# Patient Record
Sex: FEMALE | NOT HISPANIC OR LATINO | Employment: FULL TIME | URBAN - METROPOLITAN AREA
[De-identification: names, ages, dates, MRNs, and addresses within clinical notes are randomized per-mention and may not be internally consistent; named-entity substitution may affect disease eponyms.]

---

## 2023-05-05 LAB
C REACTIVE PROTEIN (MG/L) IN SER/PLAS: 0.67 MG/DL
COMPLEMENT C3 (MG/DL) IN SER/PLAS: 168 MG/DL (ref 87–200)
COMPLEMENT C4 (MG/DL) IN SER/PLAS: 55 MG/DL (ref 10–50)
SEDIMENTATION RATE, ERYTHROCYTE: 21 MM/H (ref 0–20)

## 2023-05-08 LAB — GLUCOSE-6-PHOSPHATE DEHYDROGENASE, QUANT: 14 U/G HB (ref 9.9–16.6)

## 2023-05-11 LAB
ANTI-CENTROMERE: <0.2 AI
ANTI-CHROMATIN: <0.2 AI
ANTI-DNA (DS): <1 IU/ML
ANTI-JO-1 IGG: <0.2 AI
ANTI-NUCLEAR ANTIBODY (ANA): NEGATIVE
ANTI-RIBOSOMAL P: <0.2 AI
ANTI-RNP: <0.2 AI
ANTI-SCL-70: <0.2 AI
ANTI-SM/RNP: <0.2 AI
ANTI-SM: <0.2 AI
ANTI-SSA: <0.2 AI
ANTI-SSB: <0.2 AI

## 2023-12-14 ENCOUNTER — OFFICE VISIT (OUTPATIENT)
Dept: RHEUMATOLOGY | Facility: CLINIC | Age: 38
End: 2023-12-14
Payer: COMMERCIAL

## 2023-12-14 VITALS
WEIGHT: 183 LBS | HEIGHT: 66 IN | BODY MASS INDEX: 29.41 KG/M2 | HEART RATE: 99 BPM | DIASTOLIC BLOOD PRESSURE: 89 MMHG | SYSTOLIC BLOOD PRESSURE: 129 MMHG

## 2023-12-14 DIAGNOSIS — M79.7 FIBROMYALGIA: Primary | ICD-10-CM

## 2023-12-14 PROCEDURE — 99213 OFFICE O/P EST LOW 20 MIN: CPT | Performed by: INTERNAL MEDICINE

## 2023-12-14 PROCEDURE — 1036F TOBACCO NON-USER: CPT | Performed by: INTERNAL MEDICINE

## 2023-12-14 RX ORDER — METHOCARBAMOL 500 MG/1
500 TABLET, FILM COATED ORAL NIGHTLY
Qty: 30 TABLET | Refills: 4 | Status: SHIPPED | OUTPATIENT
Start: 2023-12-14 | End: 2024-12-13

## 2023-12-14 ASSESSMENT — PAIN SCALES - GENERAL: PAINLEVEL: 0-NO PAIN

## 2023-12-14 NOTE — PROGRESS NOTES
Informants: Patient and EMR.  PP: 38 year-old female with history of sickle cell trait, history of iron deficiency anemia.  CC:  [place]:She was initially evaluated in ER 12/2020 after shovelling snow. She had complaints of chest pain and shortness of breath. She was diagnosed with chest inflammation. She noted pain with deep breathing. In 3/2021 while on vacation in [place] with sun exposure, she noted scaly rash in the malar aspect of her face and blotchy rash on her arms. She also notes some pain with swelling in the lower extremities and feet lasting 2 weeks. She had hair loss around the anterior hairline of her scalp. She noted in high school having purple or whitish discoloration of her fingernail beds. She had laboratory tests that demonstrated a positive JAMIN and an elevated CRP as well as leukopenia. She notes having more frequent headaches.  The headaches tend to improve after sleeping overnight.  She has been under stress at work and at home since her daughter moved back into her home over the past 9 days.  She has noted tiny red dots on her arms.  She has not noted any recurrence of any joint swelling or lower extremity edema.  She has some slight discoloration of her fingers with cold exposure.  She has not been taking any muscle relaxants.  PH: Allergies: No known drug allergies; illnesses: Sickle cell trait, iron deficiency anemia secondary to blood loss, sinusitis; surgeries: Essure device (2/2013) that was subsequently removed due to dysfunctional uterine bleeding, uterine ablation, dental extraction.  SH: He denies any tobacco, alcohol, or illicit drug use. 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  FH: Father at age 52 had myocardial infarction. Mother had thyroid cancer and eczema. She has 6/2 brothers with unknown health history. She has no sisters. Her son is healthy. She has a daughter with sickle cell trait and another daughter with eczema. Paternal grandfather had prostate  cancer. Maternal grandfather had heart disease. Maternal grandmother had diabetes mellitus.  ROS: She has been evaluated with annual mammograms for increased focal density in the right breast with history of clear nipple discharge.  PX: She is a well-developed, well-nourished, black female. The lungs, heart, abdomen, and extremities are benign. The musculoskeletal examination does not show any joint effusions. There is good passive range of motion of the upper and lower extremity joints.The integument does not show any rashes on exposed skin.There is a tiny red dot on the proximal extensor aspect of the right thumb.  There are no telangiectasia about the face or palms of the hands.  Chest CT scan (11/22/2020) normal without evidence of pulmonary embolism.  Laboratory (5/5/2023) G6PD 14.0, JAMIN/DEANNE panel negative, ESR 21, CRP 0.67, C3 168, C4 59,(11/27/2020) JAMIN negative, ferritin 92, CRP 1.2, BUN 10, creatinine 0.80, glucose 84, ALT 46, AST 39, alkaline phosphatase 85, calcium 10.3, albumin 4.7, (4/24/2021) CRP 10.7 [<8], (6/14/2021) WBC 3.7, hemoglobin 13.4, hematocrit 42.4, MCV 86.0, MCHC 31.6, platelets 278, JAMIN positive.  Impression: 35 year-old black female with history of blue or white discoloration of her fingernail beds since high school age, history of rash in the malar aspect of the face and forearms after sun exposure while in [place], history of chest pain with deep breathing after shoveling snow in 12/2020, positive JAMIN, elevated CRP, leukopenia, tissue disorder, hepatitis autoimmune versus viral.  Plan: She is to do low impact exercise as tolerated.  Try to stay warm to avoid Raynaud's symptoms.  No new medications were prescribed.  She is to return at the next available office appointment.

## 2024-01-02 DIAGNOSIS — M25.50 ARTHRALGIA, UNSPECIFIED JOINT: ICD-10-CM

## 2024-01-02 DIAGNOSIS — M36.0: Primary | ICD-10-CM

## 2024-01-06 ENCOUNTER — LAB (OUTPATIENT)
Dept: LAB | Facility: LAB | Age: 39
End: 2024-01-06
Payer: COMMERCIAL

## 2024-01-06 DIAGNOSIS — M25.50 ARTHRALGIA, UNSPECIFIED JOINT: ICD-10-CM

## 2024-01-06 DIAGNOSIS — M36.0: ICD-10-CM

## 2024-01-06 LAB
ALBUMIN SERPL BCP-MCNC: 4.6 G/DL (ref 3.4–5)
ALP SERPL-CCNC: 84 U/L (ref 33–110)
ALT SERPL W P-5'-P-CCNC: 24 U/L (ref 7–45)
ANION GAP SERPL CALC-SCNC: 14 MMOL/L (ref 10–20)
AST SERPL W P-5'-P-CCNC: 22 U/L (ref 9–39)
BACTERIA #/AREA URNS AUTO: ABNORMAL /HPF
BASOPHILS # BLD AUTO: 0.02 X10*3/UL (ref 0–0.1)
BASOPHILS NFR BLD AUTO: 0.4 %
BILIRUB SERPL-MCNC: 0.3 MG/DL (ref 0–1.2)
BUN SERPL-MCNC: 15 MG/DL (ref 6–23)
C3 SERPL-MCNC: 167 MG/DL (ref 87–200)
C4 SERPL-MCNC: 55 MG/DL (ref 10–50)
CALCIUM SERPL-MCNC: 10.5 MG/DL (ref 8.6–10.6)
CHLORIDE SERPL-SCNC: 103 MMOL/L (ref 98–107)
CK SERPL-CCNC: 96 U/L (ref 0–215)
CO2 SERPL-SCNC: 30 MMOL/L (ref 21–32)
CREAT SERPL-MCNC: 0.9 MG/DL (ref 0.5–1.05)
CREAT UR-MCNC: 93.8 MG/DL (ref 20–320)
CRP SERPL-MCNC: 0.66 MG/DL
EOSINOPHIL # BLD AUTO: 0.06 X10*3/UL (ref 0–0.7)
EOSINOPHIL NFR BLD AUTO: 1.3 %
ERYTHROCYTE [DISTWIDTH] IN BLOOD BY AUTOMATED COUNT: 12.5 % (ref 11.5–14.5)
GFR SERPL CREATININE-BSD FRML MDRD: 84 ML/MIN/1.73M*2
GLUCOSE SERPL-MCNC: 65 MG/DL (ref 74–99)
HCT VFR BLD AUTO: 40.2 % (ref 36–46)
HGB BLD-MCNC: 12.6 G/DL (ref 12–16)
IMM GRANULOCYTES # BLD AUTO: 0.01 X10*3/UL (ref 0–0.7)
IMM GRANULOCYTES NFR BLD AUTO: 0.2 % (ref 0–0.9)
LYMPHOCYTES # BLD AUTO: 1.94 X10*3/UL (ref 1.2–4.8)
LYMPHOCYTES NFR BLD AUTO: 40.5 %
MCH RBC QN AUTO: 26.6 PG (ref 26–34)
MCHC RBC AUTO-ENTMCNC: 31.3 G/DL (ref 32–36)
MCV RBC AUTO: 85 FL (ref 80–100)
MONOCYTES # BLD AUTO: 0.42 X10*3/UL (ref 0.1–1)
MONOCYTES NFR BLD AUTO: 8.8 %
NEUTROPHILS # BLD AUTO: 2.34 X10*3/UL (ref 1.2–7.7)
NEUTROPHILS NFR BLD AUTO: 48.8 %
NRBC BLD-RTO: 0 /100 WBCS (ref 0–0)
PLATELET # BLD AUTO: 338 X10*3/UL (ref 150–450)
POTASSIUM SERPL-SCNC: 5.1 MMOL/L (ref 3.5–5.3)
PROT SERPL-MCNC: 7.5 G/DL (ref 6.4–8.2)
PROT UR-ACNC: 10 MG/DL (ref 5–24)
PROT/CREAT UR: 0.11 MG/MG CREAT (ref 0–0.17)
RBC # BLD AUTO: 4.73 X10*6/UL (ref 4–5.2)
RBC #/AREA URNS AUTO: ABNORMAL /HPF
SODIUM SERPL-SCNC: 142 MMOL/L (ref 136–145)
SQUAMOUS #/AREA URNS AUTO: ABNORMAL /HPF
WBC # BLD AUTO: 4.8 X10*3/UL (ref 4.4–11.3)
WBC #/AREA URNS AUTO: ABNORMAL /HPF

## 2024-01-06 PROCEDURE — 84156 ASSAY OF PROTEIN URINE: CPT

## 2024-01-06 PROCEDURE — 86140 C-REACTIVE PROTEIN: CPT

## 2024-01-06 PROCEDURE — 82570 ASSAY OF URINE CREATININE: CPT

## 2024-01-06 PROCEDURE — 81001 URINALYSIS AUTO W/SCOPE: CPT

## 2024-01-06 PROCEDURE — 86160 COMPLEMENT ANTIGEN: CPT

## 2024-01-06 PROCEDURE — 86225 DNA ANTIBODY NATIVE: CPT

## 2024-01-06 PROCEDURE — 82550 ASSAY OF CK (CPK): CPT

## 2024-01-06 PROCEDURE — 85025 COMPLETE CBC W/AUTO DIFF WBC: CPT

## 2024-01-06 PROCEDURE — 86235 NUCLEAR ANTIGEN ANTIBODY: CPT

## 2024-01-06 PROCEDURE — 80053 COMPREHEN METABOLIC PANEL: CPT

## 2024-01-06 PROCEDURE — 86038 ANTINUCLEAR ANTIBODIES: CPT

## 2024-01-06 PROCEDURE — 36415 COLL VENOUS BLD VENIPUNCTURE: CPT

## 2024-01-08 LAB — ANA SER QL HEP2 SUBST: NEGATIVE

## 2024-01-09 LAB
CENTROMERE B AB SER-ACNC: <0.2 AI
CHROMATIN AB SERPL-ACNC: <0.2 AI
DSDNA AB SER-ACNC: <1 IU/ML
ENA JO1 AB SER QL IA: <0.2 AI
ENA RNP AB SER IA-ACNC: <0.2 AI
ENA SCL70 AB SER QL IA: <0.2 AI
ENA SM AB SER IA-ACNC: <0.2 AI
ENA SM+RNP AB SER QL IA: <0.2 AI
ENA SS-A AB SER IA-ACNC: <0.2 AI
ENA SS-B AB SER IA-ACNC: <0.2 AI
RIBOSOMAL P AB SER-ACNC: <0.2 AI

## 2024-11-11 DIAGNOSIS — M79.7 FIBROMYALGIA: ICD-10-CM

## 2024-11-15 ENCOUNTER — OFFICE VISIT (OUTPATIENT)
Dept: PAIN MEDICINE | Facility: HOSPITAL | Age: 39
End: 2024-11-15
Payer: COMMERCIAL

## 2024-11-15 DIAGNOSIS — M79.7 FIBROMYALGIA: ICD-10-CM

## 2024-11-15 PROCEDURE — 99212 OFFICE O/P EST SF 10 MIN: CPT | Performed by: PAIN MEDICINE

## 2024-11-15 PROCEDURE — 99202 OFFICE O/P NEW SF 15 MIN: CPT | Performed by: PAIN MEDICINE

## 2024-11-15 ASSESSMENT — PAIN - FUNCTIONAL ASSESSMENT: PAIN_FUNCTIONAL_ASSESSMENT: 0-10

## 2024-11-15 ASSESSMENT — PAIN SCALES - GENERAL: PAINLEVEL_OUTOF10: 6

## 2024-11-15 NOTE — PROGRESS NOTES
Subjective   Patient ID: Loiad Blount is a 39 y.o. female       HPI:     A 39 year-old female with PMHx of sickle cell trait, fibromyalgia, history of iron deficiency anemia presents to the clinic with a request to get a letter to employer requesting accommodation to make her work remotely.    - Pt was seen and evaluated by Rheum who did autoimmune panel and their plan was:  - Low impact exercise as tolerated.    - Advised the pt to stay warm to avoid Raynaud's symptoms.      - Pt reports being under stress with her current daughter's illness and work related stressors.     - Pt takes Robaxin 500mg at bedtime      Physical Therapy: No  Other Conservative Measures she has tried: Heating Pad  Classes of medications tried in the past: Acetaminophen and NSAIDs      Review of Systems   13-point ROS done and negative except for HPI.     Current Outpatient Medications   Medication Instructions    methocarbamol (ROBAXIN) 500 mg, oral, Nightly       No past medical history on file.     Past Surgical History:   Procedure Laterality Date    OTHER SURGICAL HISTORY  08/12/2022    Breast biopsy        No family history on file.     No Known Allergies     Objective     There were no vitals filed for this visit.     Physical Exam  General: NAD, well groomed, well nourished  Eyes: Non-icteric sclera, EOMI  Ears, Nose, Mouth, and Throat: External ears and nose appear to be without deformity or rash. No lesions or masses noted. Hearing is grossly intact.   Neck: Trachea midline  Respiratory: Nonlabored breathing   Skin: No rashes or open lesions/ulcers identified on skin.    Palpation: Tenderness to palpation over Cervical spine, shoulders, clavicle, and elbows.    Psychiatric: Alert, orientation to person, place, and time. Cooperative.    Imaging personally reviewed and independently interpreted.    Assessment/Plan     A 39 year-old female with PMHx of sickle cell trait, fibromyalgia, history of iron deficiency anemia presents  to the clinic with a request to get a letter to employer requesting accommodation to make her work remotely.    - Pt was seen and evaluated by Rheum who did autoimmune panel and their plan was:  - Low impact exercise as tolerated.    - Advised the pt to stay warm to avoid Raynaud's symptoms.      - Pt reports being under stress with her current daughter's illness and work related stressors.     - Pt takes Robaxin 500mg at bedtime    - Given the amount of stressors and fatigue the pt experiencing, it would be advisable for her to work remotely.     Plan:  -Physical therapy  -Pt will contact her Rheumatologist\PCP to get the letter to employer requesting accomodation.       The patient was invited to contact us back anytime with any questions or concerns and follow-up with us in the office as needed.     Diagnoses and all orders for this visit:  Fibromyalgia  -     Referral to Pain Medicine      The patient was given an opportunity to ask questions and were answered. The patient verbalized understanding and was agreeable to plan.    The patient was discussed and seen with Dr. Morales.      Daniel Durand MD  Anesthesiology PGY-2  [facility]

## 2024-12-26 ENCOUNTER — EVALUATION (OUTPATIENT)
Dept: PHYSICAL THERAPY | Facility: CLINIC | Age: 39
End: 2024-12-26
Payer: COMMERCIAL

## 2024-12-26 ENCOUNTER — TELEPHONE (OUTPATIENT)
Dept: RHEUMATOLOGY | Facility: CLINIC | Age: 39
End: 2024-12-26

## 2024-12-26 DIAGNOSIS — M79.7 FIBROMYALGIA: Primary | ICD-10-CM

## 2024-12-26 PROCEDURE — 97750 PHYSICAL PERFORMANCE TEST: CPT | Mod: GP | Performed by: PHYSICAL THERAPIST

## 2024-12-26 ASSESSMENT — BALANCE ASSESSMENTS
PLACE ALTERNATE FOOT ON STEP OR STOOL WHILE STANDING UNSUPPORTED: ABLE TO STAND INDEPENDENTLY AND SAFELY AND COMPLETE 8 STEPS IN 20 SECONDS
TRANSFERS: ABLE TO TRANSFER SAFELY WITH MINOR USE OF HANDS
STANDING UNSUPPORTED WITH EYES CLOSED: ABLE TO STAND 10 SECONDS SAFELY
STANDING UNSUPPORTED WITH FEET TOGETHER: ABLE TO PLACE FEET TOGETHER INDEPENDENTLY AND STAND 1 MINUTE SAFELY
STANDING UNSUPPORTED ONE FOOT IN FRONT: LOSES BALANCE WHILE STEPPING OR STANDING
REACHING FORWARD WITH OUTSTRETCHED ARM WHILE STANDING: CAN REACH FORWARD 12 CM (5 INCHES)
STANDING ON ONE LEG: TRIES TO LIFT LEG UNABLE TO HOLD 3 SECONDS BUT REMAINS STANDING INDEPENDENTLY
STANDING UNSUPPORTED: ABLE TO STAND SAFELY FOR 2 MINUTES
LONG VERSION TOTAL SCORE (MAX 56): 48
TURN 360 DEGREES: ABLE TO TURN 360 DEGREES SAFELY IN 4 SECONDS OR LESS
STANDING TO SITTING: SITS SAFELY WITH MINIMAL USE OF HANDS
PLACE ALTERNATE FOOT ON STEP OR STOOL WHILE STANDING UNSUPPORTED: LOOKS BEHIND FROM BOTH SIDES AND WEIGHT SHIFTS WELL
PICK UP OBJECT FROM THE FLOOR FROM A STANDING POSITION: ABLE TO PICK UP SLIPPER SAFELY AND EASILY
STANDING TO SITTING: ABLE TO STAND WITHOUT USING HANDS AND STABILIZE INDEPENDENTLY
SITTING WITH BACK UNSUPPORTED BUT FEET SUPPORTED ON FLOOR OR ON A STOOL: ABLE TO SIT SAFELY AND SECURELY FOR 2 MINUTES

## 2024-12-26 NOTE — PROGRESS NOTES
FCE TEST SHEET:   Date: 12/26/24  Time in:  8:30am  Time out:  11:20am    Informed Consent  Patient has been informed of all evaluation findings and treatment plans and agrees to participate in Physical Therapy services and plans as outlined.      * (FCE-NEEDS FMLA FOR NEW COMPANY) FIBROMYALGIA M79.7; ANTHEM BCBS: 20% COINSUR // 1000 /2000 DED (948.30 /1831.93 REMAINING) // PRIOR AUTH IS NOT REQ // UNLIM V BMN // 0 COPAY // [email or web address] REF#: [id number] - [facility] Created     Time Calculation  Start Time: 0830  Stop Time: 1120  Time Calculation (min): 170 min  PT Evaluation Time Entry  Physical Performance Test (with Report) Time Entry: 170         Patient Name: Loida Blount  MRN:  [id number]  YOB: 1985  Age:  39 y.o.  Address:  [address] [place] [address]  City:  [place]  State:  [place]  Zip Code:  [address]  Employer:  [employer]  Job Title:  HR   Job Description:  HR  Founded in 1866, [employer] is a  global leader in the manufacture, development, distribution, and sale of paints, coatings and related products to professional, industrial, commercial, and retail customers. The company manufactures products under well-known brands such as [employer]®, Valspar®, HGTV HOME® by [employer], Central African Boy®, Krylon®, Minwax®, Bristow® Water Seal®, Cabot® and many more. [employer] branded products are sold exclusively through a chain of more than 4,100 company-operated stores and facilities, while the companys other brands are sold through [employer], home centers, independent paint dealers, hardware stores, automotive retailers, and industrial distributors. The company supplies a broad range of highly-engineered industrial and OEM coatings for wood and general industrial, coil, packaging, protective and marine, and transportation applications worldwide. Our 60,000 employees are diverse, innovative and passionate.  With a variety of rewarding and challenging opportunities, [facility] is a great place to find a career that takes you places.   POSITION OVERVIEW:   This position provides comprehensive HR support and advisory services to employees, managers and HR Business Partners via phone, email and cases with a goal of first contact resolution in accordance with Service Level Agreements.   Collects the necessary information regarding the request, analyzes the situation and determines the policies and laws related to the situation.   Uses these facts and previous experience related to similar situations to determine the appropriate course of action to handle the request.   Takes action or communicates information to the appropriate parties to resolve. Groups serviced include all [place] employees, managers, HR Business Partners, ex-employees, retirees, and other internal [facility] Departments.   Note: HR Services hours are Monday through Friday, 8:30 a.m. to 7:00 p.m.   Advisors are expected to work flexible shifts within those hours, to service our customers and business needs accordingly.   ESSENTIAL FUNCTIONS:   1. HR SERVICE CENTER RESPONSIBILITIES:   * Develop a comprehensive understanding of all HR Service Center operations, processes, systems and applicable policies and laws   * Answer questions, provide guidance and assist to employees, managers, and HR Business Partners in areas such as nursing home, Leave of Absence, [employer], GEMS administration, company policies, performance management systems, [facility]) and Careers.   * Ensure timely and correct response for all customers inquiries, complaints and requests and provide accurate consistent recordkeeping and maintenance of HR records. For cases escalated to other groups ensure that the request is resolved and the employee is contacted at the conclusion.   * Actively listen and ask appropriate follow up questions to collect  necessary information to identify underlying issues or related requests.   * Analyze information obtained and determine which policies and laws are relevant to the situation. Use the information to make decisions on action items needed to resolve the situation. Inform or escalate issues to HR BPs and COEs as needed.   * Utilize empathy, diplomacy & tact to promote positive employee engagement.   * Ensure fairness, consistency of practice, alignment with policies and procedures and adherence to all relevant laws in all situations handled.   * Identifies common questions and issues and create solutions to address them.   * Maintain data in the various HR systems (GEMS, Kronos, Careers etc.) Ensure data integrity and preform accurate and timely entry. * Perform designated audit reports and make the necessary corrections in the system, or work with the appropriate groups to accomplish.   * Ensure the various employee compliance notifications are sent as directed and if they do not reach the designated  reach out the to the HR BP or District coordinator to facilitate.   * Act as a liaison for employees, retirees, ex-employees, managers and HR to obtain answers to questions or resolve issues by partnering with other intercompany departments such as Employee Relations, Tax, Payroll, Legal, and Benefits   * Collect all necessary paperwork to document and administrate retirements and notify HR and Management of any pertinent details including how to update time keeping and GEMS accurately.   2. STANDARDIZATION AND PROCESS IMPROVEMENT   * Analyze tasks assigned and redesign and document each in a Standard Operating Procedure (SOP). Update SOPs as process changes. * Identify other potential activities that can be added to Shared Services.   * Participate in Transactional Lean Training & become LEAN Certified. Use the methods & tools learned through LEAN training to identify and implement continuous improvement  initiatives within the department and/or for the [facility] HR community throughout the year.   3. HR TOOLS & SERVICE LEVEL AGREEMENTS:   * Answer phone calls, respond to emails and IMs, create cases and preform responsibilities within the established service level agreements for each duty assigned.   * Utilize case management to document each request and to escalate work to appropriate teams. Leverage the case management self-service portal with HRBPs, Managers and employees to assist with self-service.   * Resolve assigned cases within designated Service Level Agreements   * Leverage [employer] Systems, [employer], and [employer] to obtain information and  HRBPs, employees, and managers on how to find and use these resources.   POSITION REQUIREMENTS: Basic Qualifications:   * Bachelors Degree from an accredited institution is required   * 1-3 years of experience in a fast paced customer service environment   * Must be authorized to work in the [place] without company sponsorship Preferred Qualifications:   * Human Resources experience   * Bachelors Degree in Human Resources or business related field   * Bi-lingual ability (Mexican) Personal Attributes:   * Exceptional interpersonal and customer service skills   * Excellent verbal and written communication skills   * Exceptional organizational skills with attention to detail, accuracy and ability to maintain confidential data   * Ability to analyze situations and interpret facts to make decisions and resolve problems   * Ability to manage multiple simultaneous tasks with minimal supervision   * Demonstrated ability to work in a team  Strong computer skills   * Adaptability to change and desire and ability to make improvements and to continuously improve processes and systems   Equal Opportunity Employer. All qualified candidates will receive consideration for employment and will not be discriminated against based on race, color, Methodist, sex, sexual orientation, gender  identify, national origin, protected  status, disability, age, pregnancy, genetic information, creed, citizenship status, marital status or any other consideration prohibited by law or contract. [facility] Federal Contractor requesting priority referral of protected veterans.   **Primary Location: **[facility] **Work Locations: ** **Organization: **[employer] **Schedule: **Full-time **Job Posting: **Jul 11, 2018 **Travel: **No  Occupation at time of injury:  Payment  at [employer]  Physician:  Dr. Habibeh Gitiforooz  Primary Diagnosis:  Diagnosis and Precautions: M79.7 (ICD-10-CM) - Fibromyalgia  Referred by: Referred By: Dr. Henao  Date of Injury (If Applicable):  N.A.  Gender: FEMALE  Dominant Hand: RIGHT HANDED  Current Occupation:   at [employer]    Reason for Testing:   Work from home vs. Returning to office full time    Description of test done:  RETURN TO WORK FCE ONE DAY    This cover letter provides a brief summary of relevant data regarding Loida Blount's physical abilities that were ascertained from the Functional Capacity Evaluation performed on 12/26/2024.      History:  Patient is a 39 year old RIGHT HANDED dominant FEMALE who comes in with an extensive medical history.  SHE stated that SHE is currently working and SHE is completing the majority of her everyday tasks.  SHE completed all tasks as able, within HER tolerance limits.  These tests were performed in a consistent manner.  These medical conditions and findings were gathered during the patient interview, demonstration and from patient report.  The results were also discussed with the client.      Overall Physical Demand Level:  Light/Medium    Effort and Cooperation:  Client's patterns of movement are consistent with the effort performed.  Client demonstrated cooperative behavior throughout testing.  Patient maintained a consistent level of effort  throughout the evaluation.  Patient followed instructions accurately and completely.  No instances of non-compliance or resistance.  Patient was open and honest about their limitations and symptoms.  Patient appeared motivated to perform to their best ability during testing.  No signs of disengagement or lack of interest during testing.    Consistency of Performance:  Client's performance was consistent throughout the entire FCE.  Patient actively participated during the evaluation.  Patient maintained a consistent pace and accuracy during various tasks.      Pain Report:  Patient does complain of increased neck/lower back pain throughout testing.    Safety:  Patient demonstrates safe overall movements during FCE testing.    Quality of Movement:  Client demonstrates good quality of movement overall during testing including movement efficiency (coordination and balance).    Abilities/Strengths:  SITTING TOLERANCE, STANDING BALANCE, WALKING BALANCE, CLIMBING STAIRS, KNEELING BILATERALLY, KNEELING ON ONE KNEE, STOOPING, FOOT CONTROL, REACHING, SIMPLE GRASP, POWER GRASP, PINCHING, and SENSORY DISCRIMINATION    Limitations:  STANDING TOLERANCE, WALKING TOLERANCE, UPPER EXTREMITY ROM, LOWER EXTREMITY ROM, SPINAL ROM, UPPER EXTREMITY STRENGTH, LOWER EXTREMITY STRENGTH, CORE STRENGTH, CROUCHING BALANCE, MATERIAL HANDLING, CLIMBING LADDERS, CROUCHING, SQUATTING, CRAWLING (HANDS/KNEES), CRAWLING (HANDS/FEET), and CARDIORESPIRATORY    Potential Barriers to Return to Work  Work Schedule and Environment  Current Arrangement: Patient works in-office five days per week but prefers a hybrid schedule (three days at home, two in-office) due to fibromyalgia, joint pain, and limited sitting tolerance.  Challenges:  Prolonged sitting requirements due to significant desk work involving emails, calls, and HR systems (e.g., [employer], GEMS).  Current office setup does not allow for posture variation or frequent movement breaks.  Limited  flexibility in remote work options within the current job description.  Physical Limitations  Sitting Tolerance: Low due to elevated lower back and neck pain.  Mobility:  Reduced standing and walking tolerance.  Stiffness and bilateral joint pain in shoulders, knees, hips, and IT bands, impacting movement and repetitive tasks.  Underlying Conditions:  Fibromyalgia, hypertension, and Raynaud’s exacerbate fatigue and pain.  High disability index scores (Neck Disability Index: 42%, Oswestry: 46%) indicate significant functional impairments.  Cognitive and Psychological Factors  Chronic Pain and Anxiety: May reduce focus and productivity.  High Cognitive Demands:  Requires strong organizational skills, attention to detail, and multitasking.  Sleep apnea, migraines, and fibro fog may further impair concentration and energy levels.  Physical/Ergonomic Risks  Repetitive hand and wrist movements for  may aggravate bilateral shoulder, arm, and IT band pain.  Long hours (Monday to Friday, 8:30 AM to 7:00 PM) exacerbate fatigue and pain due to limited rest and recovery time.    Recommendations for Addressing Concerns  Workplace Accommodations  Hybrid Work Schedule:  Implement a three-day remote, two-day in-office schedule to reduce commuting strain and support flexible posture changes.  Ergonomic Adjustments:  Provide an adjustable ergonomic chair with lumbar and neck support.  Use a height-adjustable desk to alternate between sitting and standing.  Ensure external peripherals (keyboard, mouse) and screen height minimize strain.  Breaks and Posture Changes:  Schedule short breaks every 30-60 minutes for movement and stretches (e.g., neck rolls, thoracic extensions, seated hip stretches).  Use alarms or reminders to encourage consistent breaks.  Task Modifications:  Break complex tasks into manageable segments.  Delegate physically demanding tasks where feasible.  Supportive Tools:  Use [id number] software and  wrist rests to reduce repetitive strain.  Provide heat/cold packs or lumbar cushions for pain relief.  Medical and Physical Therapy Support  Pain Management:  Utilize manual therapy, TENS, and heat/ice applications for symptom relief.  Targeted Exercises:  Gentle stretching for the cervical, thoracic, and lumbar spine.  Strengthening of postural muscles (core, glutes, upper back).  Low-impact aerobic activities (e.g., recumbent biking, swimming).  Fibromyalgia-Specific Strategies:  Gradual activity progression to avoid overexertion.  Incorporate relaxation techniques like diaphragmatic breathing and mindfulness.  Cognitive Support  Use digital task management tools for reminders and prioritization.  Provide written instructions for complex assignments to counteract fibro fog.  Limit multitasking and allow focused attention on one task at a time.  Employer Communication and Advocacy  HR Collaboration:  Advocate for reasonable accommodations under ADA guidelines.  Provide medical documentation to support the need for a hybrid schedule and ergonomic modifications.  Education and Awareness:  Educate team members and supervisors on fibromyalgia to foster a supportive work environment.  Regular Follow-Up:  Schedule periodic reassessments (every 3-6 months) to evaluate the effectiveness of accommodations and make necessary adjustments.    Short-Term Return to Work Plan  Hybrid Schedule: Begin with three remote days and two in-office days, reassess after 4-6 weeks.  Ergonomic Setup: Ensure proper workstation adjustments both at home and in-office.  Structured Breaks: Schedule micro-breaks and movement sessions throughout the workday.  Progress Tracking: Regularly monitor pain, function, and productivity to refine accommodations.    Long-Term Recommendations  Maintain a flexible hybrid work arrangement if effective.  Continue physical therapy and pain management interventions.  Evaluate functional capacity periodically to  align job responsibilities with the patient’s evolving capabilities.    Summary/Recommendations:  RECOMMEND PHYSICAL THERAPY, RECOMMEND PAIN MANAGEMENT, RECOMMEND SEEING A SPINE SPECIALIST/SURGEON, GET A SCRIPT FOR WORK CONDITIONING, and FOLLOW UP WITH YOUR PHYSICIAN WHO REFERRED YOU FOR THIS FCE  FUNCTIONAL TOLERANCES SUMMARY  SITTING TOLERANCE ABLE  OCCASIONAL   STANDING TOLERANCE ABLE  OCCASIONAL   WALKING TOLERANCEABLE  RARE   STANDING BALANCE ABLE  OCCASIONAL   WALKING BALANCE ABLE  OCCASIONAL   CROUCHING BALANCE patient deferred testing    CLIMBING STAIRS ABLE  OCCASIONAL   CLIMBING LADDERS  patient deferred testing   KNEELING BILATERALLY ABLE  RARE   KNEELING ON ONE KNEE ABLE  RARE   STOOPING ABLE  RARE   SQUATTING RESTRICTED  AVOID   CRAWLING (HANDS/KNEES)  patient deferred testing   CRAWLING (HANDS/FEET) patient deferred testing     FOOT CONTROL   Heel raises  right ABLE  OCCASIONAL   left ABLE  OCCASIONAL  Toe Raises  right ABLE  RARE   left ABLE  RARE  DOMINANT HAND (RIGHT OR LEFT):  right  REACHING OVERHEAD   R ARM ABLE  OCCASIONAL   LEFT ARM ABLE  OCCASIONAL   REACHING BELOW SHOULDER   right ABLE  OCCASIONAL   LEFT ARM ABLE  OCCASIONAL   SIMPLE GRASP   RIGHT ARM ABLE  FREQUENT   LEFT ABLE  FREQUENT   POWER GRASP   RIGHT ARM ABLE  FREQUENT    LEFT ABLE  FREQUENT   TIP PINCH   RIGHT ABLE  FREQUENT    LEFT ABLE  FREQUENT   KEY PINCH   RIGHT ABLE  FREQUENT    LEFT ABLE  FREQUENT   PALMAR PINCH   RIGHT ABLE  FREQUENT   LEFT ABLE  FREQUENT   KEYBOARDING   RIGHT ABLE  FREQUENT    LEFT ABLE  FREQUENT   SENSORY DISCRIMINATION   RIGHT ABLE  FREQUENT    LEFT ABLE  FREQUENT   SEE MATERIAL HANDLING CHART BELOW    CARDIORESPIRATORY (VO2 MAX AND MET LEVEL)      If permanent, the results of the FCE should be considered applicable for a range of time up to 6 months. This is dependent on the nature of the injury/illness, and whether any other health condition, injury or other factor changes the individual's health status  or lifestyle. In the absence of any substantive change in the individual's health status or lifestyle, a repeat FCE to update the individual's functional status is recommended.    It is my professional opinion that patient Loida Blount gave a consistent performance and effort during HER functional capacity evaluation. The results of this evaluation are a valid representation of HER current functional abilities.     OP PT Plan  PT Plan: No Additional PT interventions required at this time  PT Frequency: One time visit  Duration: 1 visit  Certification Period Start Date: 12/26/24  Certification Period End Date: 01/09/25  Number of Treatments Authorized: 1  Rehab Potential: Good  Plan of Care Agreement: Patient    Sincerely,  Lokesh SEAMAN Diana, PT, [id number]     FCE History  Any falls in the past 6 months?  NO    Mechanism/Type of injury:  Patient reports that everything started 5 years ago.  Patient reports having her first mammogram and was having R sided breast pain.  Patient reports having a breast biopsy due to her R breast leaking and in pain.  Patient reports seeing a Rheumatologist 4 years ago Dr. Cottrell and had 2 positive JAMIN tests.  Patient reports she started having aches and pains everywhere.  Patient reports being prescribed muscle relaxants and she would get very tired and she would only take them if she had a 3 day weekend.  Patient reports she has tried Yoga and struggled with this.  Patient reports using Asper creme for the pain.  Patient reports being diagnosed with Fibromyalgia 4 years ago.  Patient reports having no treatment for the Fibromyalgia thus far.    Previous Treatment:  Chiropractic ?  NO   Pain Management ?   NO   Acupuncture ?   NO   Surgery ?    NO   Previous Physical therapy?    NO   Previous Occupational therapy ?  NO   Previous speech therapy ?  NO     MEDICAL HISTORY   Note:  Copy Problem List from Snapshot  HYPERTENSION, HIGH CHOLESTEROL, ANGINA, ANEMIA, ANXIETY, SLEEP APNEA,  HEARING/VISION IMPAIRMENTS, HEADACHES, MIGRAINES, and FIBROMYALGIA, Raynaud's  Medical Precautions:  (See FCE)  STEADI Fall Risk Score (The score of 4 or more indicates an increased risk of falling): 0     Medical Precautions:  (See FCE))     History of Fractures/Dislocations:  none  Any previous orthopedic surgeries:  none  Use of Assistive Devices:  none    OTHER JOINT ISSUES, CERVICAL SPINE, THORACIC SPINE, LUMBAR SPINE, HIPS, KNEES, and SHOULDERS      General Health Checklist  GENERAL HEALTH CHECKLIST SYMPTOMS, ANY RECENT, UNEXPLAINED FATIGUE, CHILLS, and COGNITION CHANGES      SURGERIES: breast biopsy, partial hysterectomy    ALLERGIES: Seasonal    MEDICATIONS:    Current Outpatient Medications   Medication Instructions    methocarbamol (ROBAXIN) 500 mg, oral, Nightly   Steroid cream (as needed)    Radiology:    None    Functional Status/Activity level:  Level of Posey:  (See FCE)  basic ADL's/instrumental ADL's including  Sleeping:  Having issues with activity, see side note averages 4-5 hours of sleep per night (neck/back wakes her up)  Dressing:   Having issues with activity, see side note socks can be a struggle  Using the restroom/toilet:   No issues, independent with activity   Bathing or Showering:   No issues, independent with activity   Personal hygiene:   No issues, independent with activity   Eating and Drinking:   No issues, independent with activity   Preparing meals/Cooking:   No issues, independent with activity   Washing dishes:   No issues, independent with activity   Managing Medication:   No issues, independent with activity does not take her meds as much as she should  Reaching all planes:   Having issues with activity, see side note back/neck pain   Using phone/Technology/Computer/Ipad:   Having issues with activity, see side note back/neck pain  Reading:   No issues, independent with activity    Writing:   No issues, independent with activity   Typing:   No issues, independent with  activity   Managing finances:   No issues, independent with activity   Driving:  No issues, independent with activity   Shopping/ Getting Groceries:   No issues, independent with activity   Running errands/Appointments:   No issues, independent with activity   Cleaning / Maintaining home:   No issues, independent with activity   Vacuuming:   No issues, independent with activity   Dusting:   No issues, independent with activity   Laundry:  No issues, independent with activity   Environment    Heat:   Having issues with activity, see side note stays inside  Cold:   Having issues with activity, see side note stays inside (fingers hurt)  Dry:   No issues, independent with activity   Wet:   Having issues with activity, see side note increased joint pain  Walking:   Having issues with activity, see side note increased back pain/leg pain if walking > 1 mile  Standing:   Having issues with activity, see side note increased pain after 20 minutes  Sitting:   Having issues with activity, see side note increased pain after 20-30 minutes  Stairs:   No issues, independent with activity   Bending:   Having issues with activity, see side note increased back pain  Stooping:  Having issues with activity, see side note increased back pain  Squatting:   Having issues with activity, see side note increased leg pain  Lifting/pushing/pulling/carrying:   Having issues with activity, see side note increased back/leg pain  Hobbies:  No issues, independent with activity   Work:   Having issues with activity, see side note increased back pain/neck pain  Pet care:   No issues, independent with activity     Impact on School/work performance:  increased back/neck pain at work if sitting prolonged periods  Last time/day worked:  working currently    PRIOR INJURIES: Date or Year Type of Injury (i.e.-auto, work):  none     CHIEF COMPLAINTS (if applicable):   Pain Assessment:  (See FCE)  Neck pain/stiffness  Lower back pain/stiffness  Upper back  pain/stiffness  Leg pain  Bilateral shoulder pain  Bilateral knee pain/hip pain  Bilateral IT band pain  Pain Assessment  Pain Assessment:  (See FCE)    Pain report:  Body Region: 1. Cervical spine 2. Thoracic spine  3. Lumbar spine 4. R hip and L hip 5. R knee and L knee, 6.  Bilateral shoulders, 7.  Bilateral IT band pain  Pain level current:  1. 6/10 2. 4/10 3. 6/10 4. 6/10 5. 4/10, 6.  4/10, 7. 2/10  Pain level least in a 24 hour period: 1. 3/10 2. 3/10 3. 3/10 4. 3/10 5. 3/10, 6. 3/10, 7. 2/10  Pain level worst in a 24 hour period: 1. 6/10 2. 4/10 3. 6/10 4. 6/10 5. 6/10, 6.  4/10, 7. 3/10    Location of pain:  bilateral IT band pain, posterior neck pain, lower back pain, lateral hip pain, bilateral anterior knee pain  Quality of pain:  sharp pain, throbbing, shooting pain  Duration of pain:  constant  Aggravating factors:  prolonged sitting/standing/walking, stairs, bending, lifting, carrying/push/pull  Alleviating factors:  none    Return to work information: see job description  Goals:  Get this test done    Systems Review:  as filled out by patient (positives listed below by each system)  Cardiovascular- SHORTNESS OF BREATH, IS YOUR BLOOD PRESSURE UNDER CONTROL, and UNSURE     Constitutional- FATIGUE and CHILLS   Endocrine:  HEAT INTOLERANCE, COLD INTOLERANCE, DRY SKIN, IS YOUR BLOOD SUGAR UNDER CONTROL ?, and YES  GI:  NONE   Genitourinary:  NONE  Hematology:   NONE   HENT:  NONE  Integumentary:  NONE  Respiratory:  NONE  Musculoskeletal:  DIFFICULTY LYING FLAT DUE TO MUSCLE PAIN and BACK PAIN   Neurologic:  HEADACHES and NUMBNESS/TINGLING    Outcome Measures:  Other Measures  Neck Disability Index: 42%  Oswestry Disablity Index (ULISES): 46%    Pain Disability Index  Family Home Responsibilities  4  Recreation  4  Social Activity  4  Occupation  6  Sexual Behavior  3  Self Care  3  Life Supporting Activities  5  29/70    Objective Testing  Vital Signs  Heart Rate (HR):  74 bpm  Blood Pressure (BP):  122/82  mmHg  Oxygen Saturation (O2 Sat):  99%  Height:  5 feet, 5.5 inches  Weight:  199.8 lbs.    Orientation:  SHE was alert, oriented x 3    Splints/Braces/Assistive Devices worn by patient during FCE:  None  Gait:  Normal gait  Coordination:  bilateral upper extremity coordination WNL's, decreased lower extremity coordination bilaterally  Sensation:  patient denies numbness/tingling of bilateral upper/lower extremities  Palpation:  point tenderness over bilateral upper trapezius, levator scapula, cervical/lumbar paraspinals  Strength/ROM:  see Excel spread sheet  Upper Extremity:  see Excel spread sheet  Right:  see Excel spread sheet  Left:  see Excel spread sheet  Lower Extremity:  see Excel spread sheet  Right:  see Excel spread sheet  Left:  see Excel spread sheet  Lumbar AROM:  see Excel spread sheet  Cervical AROM:  see Excel spread sheet  Thoracic AROM:  see Excel spread sheet    FCE TEST SHEET:   Integumetary System: NOT IMPAIRED   Neuromuscular System: IMPAIRED Core stability/core strength  Musculoskeletal System: IMPAIRED Gross Range of Motion, Gross Strength    Communication/Cognition: NOT IMPAIRED      FCE TEST ACTIVITIES SIT, STAND, WALK TOLERANCES   SITTING (Norm: 30 min) Max Time: 30 Minutes  JOINT PAIN and GRIMACING  STANDING (Norm: 30 min) Max Time: 20 Minutes  JOINT PAIN, LEAN ON SUPPORT/DEVICE, and GRIMACING   WALKING (Norm: 30 min @ 2.0 mph) Max Time: 7  Minutes JOINT PAIN, LOWER EXTREMITY WEAKNESS, SHORTNESS OF BREATH, and HOLD RAILING    Any falls in the past 6 months?  NO   BALANCE  TESTING  Standing Balance (Norm: 30 sec) ABLE  OCCASIONAL, HR 96 bpm   Walking Balance (Norm: 6 feet) ABLE  OCCASIONAL,  bpm   Crouching Balance (Norm: 30 sec) patient deferred test     MORAN Test 48 / 56 LOW Fall Risk  Moran Balance Scale  1. Sitting to Standing: Able to stand without using hands and stabilize independently  2. Standing Unsupported: Able to stand safely for 2 minutes  3. Sitting with Back  Unsupported but Feet Supported on Floor or on a Stool: Able to sit safely and securely for 2 minutes  4. Standing to Sitting: Sits safely with minimal use of hands  5.  Transfers: Able to transfer safely with minor use of hands  6. Standing Unsupported with Eyes Closed: Able to stand 10 seconds safely  7. Standing Unsupported with Feet Together: Able to place feet together independently and stand 1 minute safely  8. Reach Forward with Outstretched Arm While Standing: Can reach forward 12 cm (5 inches)  9.  Object from Floor from a Standing Position: Able to  slipper safely and easily  10. Turning to Look Behind Over Left and Right Shoulders While Standing: Looks behind from both sides and weight shifts well  11. Turn 360 Degrees: Able to turn 360 degrees safely in 4 seconds or less  12. Place Alternate Foot on Step or Stool While Standing Unsupported: Able to stand independently and safely and complete 8 steps in 20 seconds  13. Standing Unsupported One Foot in Front: Loses balance while stepping or standing  14. Standing on One Leg: Tries to lift leg unable to hold 3 seconds but remains standing independently  Saunders Balance Score: 48  Other Measures  Neck Disability Index: 42%  Oswestry Disablity Index (ULISES): 46%     MATERIAL HANDLING (Occasional)  Empty box weighs 7.5 lbs.  RPE Scale 1-10 Description  No exertion at all  Very light  Light  Between Light and somewhat hard  Somewhat hard  Between somewhat hard and Hard (Heavy)  Hard (heavy)  Between Hard and very hard  Between very hard and maximal exertion  Maximal Exertion    Combined Lift/Carry Test    Endpoint: PSYCHOPHYSICAL   Weight lifted: 27.5 lbs.  RPE:  7  HR:  79 bpm  Pain:  6/10 lower back, 8/10 neck    PUSH (25 feet - Norm: 100 lbs)    Endpoint: PSYCHOPHYSICAL    Weight lifted:  50 lbs.  RPE:  7  HR:  91 bpm  Pain:  6/10 lower back    PULL (25 feet - Norm: 80 lbs)   Endpoint: PSYCHOPHYSICAL    Weight lifted:  50 lbs.  RPE:  7  HR:  91  bpm  Pain:  6/10 lower back    Floor to Waist Lift   Endpoint: PSYCHOPHYSICAL    Weight lifted:  32.5 lbs.  RPE:  5  HR:  84 bpm  Pain:  8/10 Neck    Waist to Shoulder   Endpoint: PSYCHOPHYSICAL    Weight lifted:  22.5 lbs.  RPE:  6  HR:  83 bpm  Pain:  7/10 neck, lower back    Shoulder to Overhead   Endpoint: PSYCHOPHYSICAL    Weight lifted:  7.5 lbs.  RPE:  6  HR:  75 bpm  Pain:  7/10 Neck    Bilateral Carry (14 Feet)   Endpoint: PSYCHOPHYSICAL    Weight lifted:  17.5 lbs.  RPE:  6  HR:  85 bpm  Pain:  7/10 Neck, lower back    FUNCTIONAL ACTIVITIES A   Climbing Stairs (Norm: up/down 1 flight) ABLE  OCCASIONAL,  bpm   Limits: FATIGUE and REQUIRES RAILING/DEVICE  Climbing Ladders (up/down 10 ft ladder)   patient deferred testing       Kneel: any knee (Norm: 30 seconds) ABLE  RARE, HR 74 bpm  Kneel: Both knees (Norm: 30 seconds) ABLE  RARE, HR 74 bpm    Stooping: Static forward bend test:  ABLE  RARE, HR 98 bpm   Repetitive Squat Test:  ARU: RESTRICTED   AVOID  # Reps: 7 Limits: WEAKNESS and JOINT PAIN,  bpm    FUNCTIONAL ACTIVITIES B   Crawl: hands/Knees (Norm: 6 feet)   patient deferred testing    Crawl: hand/feet (Norm: 6 feet)      patient deferred testing      Foot Control - Rt (5 toe/5 calf raises) heel raises ABLE  OCCASIONAL, HR 90 bpm, toe raises ABLE  RARE, HR 73 bpm    Overhead Reaching Test:  ABLE  OCCASIONAL, HR 91 bpm     HAND DEXTERITY - HANDLING   Simple Grasp - Seize, hold, grasp, or turn small objects with the RIGHT hand? ABLE  FREQUENT, HR 72 bpm   Simple Grasp - Seize, hold, grasp, or turn small objects with the LEFT hand? ABLE  FREQUENT      Power Grasp -    Strength Rt TRIAL 1:  40 lbs. TRIAL 2:  40 lbs. TRIAL 3: 50 lbs.   Power Grasp -  Strength Lt TRIAL 1: 38 lbs. TRIAL 2: 54 lbs. TRIAL 3: 56 lbs., HR 72 bpm         HAND DEXTERITY - FINGERING   Simple Picking:  a nut with all fingers   RT hand? ABLE  FREQUENT, HR 76 bpm    Simple Picking:  a nut with all  fingers   LT hand? ABLE  FREQUENT      In lbs.  TIP PINCH Trial 1 - RT:  9.4/10.6 LT: Trial 2 - RT: 6.0/6.8 LT:  Trial 3 - RT:  7.6/10.5 LT:   KEY PINCH Trial 1 - RT: 17.4/17.1 LT: Trial 2 - RT: 17.9/21.3 LT: Trial 3 - RT: 19.2/13.8 LT:   PALMAR PINCH Trial 1 - RT: 10.3/12.5 LT: Trial 2 - RT: 7.5/11.0 LT:  Trial 3 - RT: 8.5/11.7 LT:, HR 76 bpm      SENSORY   Discrimination between round and rectangular (washer vs. square nut):   RT: ABLE  FREQUENT    LT: ABLE  FREQUENT, HR 68 bpm    Discrimination between large and small objects (quarter vs. dime):   RT: ABLE  FREQUENT    LT: ABLE  FREQUENT      Discrimination between rough and smooth textures (sandpaper vs. cloth):   RT: ABLE  FREQUENT    LT: ABLE  FREQUENT     CARDIO-RESPIRATORY (if applicable): VARIABLES (If applicable)   Name or Type of Test: 1 mile treadmill walking test  Resting HR:  78 bpm  Speed of Treadmill: 2.0 mph  Max HR: 120 bpm  Distance: Other:   0.24 mile  Time:  7 minutes, 21 seconds  % grade:  0%  Calories:  21.1 calories  Blood pressure before testin/89 mmHg  Blood pressure after test:  134/90 mmHg  HR recovery:  81 bpm  Oxygen Saturation before testin%  Oxygen Saturation after testin%  39 year old female  Weight:  199.8 lbs.  Stopped testing due to complaints of lower back pain  Please estimate the VO2 max and MET level and show calculations.                CONSISTENCY OF EFFORT -   OVERALL OBSERVATION In your professional opinion, did the patient give a consistent performance and effort during this functional capacity evaluation? YES   BELL SHAPE CURVE   In lbs.  Right hand  Level 1:  32  Level 2:  46  Level 3:  52  Level 4:  39  Level 5:  34    Left hand  Level 1:  42  Level 2:  54  Level 3:  42  Level 4 :  41  Level 5:  30  HR  70 bpm    JAI NON ORGANIC SIGNS TEST   Superficial tenderness:  POSITIVE  Non-anatomic SIMULATION:  NEGATIVE  Axial Loading:  NEGATIVE  Acetabular rotation:  POSITIVE  Distraction:   NEGATIVE  Regional sensory disturbance:  NEGATIVE  Regional weakness:  NEGATIVE  Overreaction: NEGATIVE    BP at end of session:  134/90 mmHg  HR at end of session:  81 bpm  O2 Saturation at end of session:  98%

## 2024-12-26 NOTE — TELEPHONE ENCOUNTER
Patient needs her plan of care signed and faxed back to Dr. Ortiz. The plan of care is scanned in her chart.

## 2024-12-26 NOTE — LETTER
December 26, 2024    Lokesh Ortiz, PT  [address]  Rehab Services, [address]  [facility] [place] [address]    Patient: Loida Blount   YOB: 1985   Date of Visit: 12/26/2024       Dear Sonny Yuan MD  [address] [facility] [address]  [place] [address]    The attached plan of care is being sent to you because your patient’s medical reimbursement requires that you certify the plan of care. Your signature is required to allow uninterrupted insurance coverage.      You may indicate your approval by signing below and faxing this form back to us at Dept Fax: [phone].    Please call Dept: [phone] with any questions or concerns.    Thank you for this referral,        Lokesh Ortiz PT  [place] [address] [facility]  [address] [facility] [address]    Payer: Payor: ANTHEM / Plan: ANTHEM HMP / Product Type: *No Product type* /                                                                         Date:     Dear Lokesh Ortiz, PT,     Re: Ms. Loida Blount, MRN:[id number]    I certify that I have reviewed the attached plan of care and it is medically necessary for Ms. Loida Blount (1985) who is under my care.          ______________________________________                    _________________  Provider name and credentials                                           Date and time                                                                                           Plan of Care 12/26/24   Effective from: 12/26/2024  Effective to: 1/9/2025    Plan ID: [id number]            Participants as of Finalize on 12/26/2024    Name Type Comments Contact Info    Sonny Yuan MD Referring Provider  [phone]    Lokesh Ortiz PT Physical Therapist  [phone]       Last Plan Note     Author: Lokesh Ortiz PT Status: Incomplete Last edited: 12/26/2024  8:30 AM       FCE TEST SHEET:   Date: 12/26/24  Time in:  8:30am  Time out:   11:20am    Informed Consent  Patient has been informed of all evaluation findings and treatment plans and agrees to participate in Physical Therapy services and plans as outlined.      * (FCE-NEEDS FMLA FOR NEW COMPANY) FIBROMYALGIA M79.7; ANTHEM BCBS: 20% COINSUR // 1000 /2000 DED (948.30 /1831.93 REMAINING) // PRIOR AUTH IS NOT REQ // UNLIM V BMN // 0 COPAY // [email or web address] REF#: [id number] - [facility]     Time Calculation  Start Time: 0830  Stop Time: 1120  Time Calculation (min): 170 min  PT Evaluation Time Entry  Physical Performance Test (with Report) Time Entry: 170         Patient Name: Loida Blount  MRN:  [id number]  YOB: 1985  Age:  39 y.o.  Address:  [address] [place] [address]  City:  [place]  State:  [place]  Zip Code:  [address]  Employer:  [employer]  Job Title:  HR   Job Description:  HR  Founded in 1866, [employer] is a  global leader in the manufacture, development, distribution, and sale of paints, coatings and related products to professional, industrial, commercial, and retail customers. The company manufactures products under well-known brands such as Stackpop®, Valspar®, HGTV HOME® by Stack[employer], [place]orese Boy®, Krylon®, Minwax®, [place]® Water Seal®, Cabot® and many more. [employer] branded products are sold exclusively through a chain of more than 4,100 company-operated stores and facilities, while the companys other brands are sold through [employer], home centers, independent paint dealers, hardware stores, automotive retailers, and industrial distributors. The company supplies a broad range of highly-engineered industrial and OEM coatings for wood and general industrial, coil, packaging, protective and marine, and transportation applications worldwide. Our 60,000 employees are diverse, innovative and passionate. With a variety of rewarding and challenging opportunities,  [facility] [employer] is a great place to find a career that takes you places.   POSITION OVERVIEW:   This position provides comprehensive HR support and advisory services to employees, managers and HR Business Partners via phone, email and cases with a goal of first contact resolution in accordance with Service Level Agreements.   Collects the necessary information regarding the request, analyzes the situation and determines the policies and laws related to the situation.   Uses these facts and previous experience related to similar situations to determine the appropriate course of action to handle the request.   Takes action or communicates information to the appropriate parties to resolve. Groups serviced include all [place] employees, managers, HR Business Partners, ex-employees, retirees, and other internal [facility] Departments.   Note: HR Services hours are Monday through Friday, 8:30 a.m. to 7:00 p.m.   Advisors are expected to work flexible shifts within those hours, to service our customers and business needs accordingly.   ESSENTIAL FUNCTIONS:   1. HR SERVICE CENTER RESPONSIBILITIES:   * Develop a comprehensive understanding of all HR Service Center operations, processes, systems and applicable policies and laws   * Answer questions, provide guidance and assist to employees, managers, and HR Business Partners in areas such as group home, Leave of Absence, Kronos, GEMS administration, company policies, performance management systems, [facility]) and Careers.   * Ensure timely and correct response for all customers inquiries, complaints and requests and provide accurate consistent recordkeeping and maintenance of HR records. For cases escalated to other groups ensure that the request is resolved and the employee is contacted at the conclusion.   * Actively listen and ask appropriate follow up questions to collect necessary information to identify underlying issues or related  requests.   * Analyze information obtained and determine which policies and laws are relevant to the situation. Use the information to make decisions on action items needed to resolve the situation. Inform or escalate issues to HR BPs and COEs as needed.   * Utilize empathy, diplomacy & tact to promote positive employee engagement.   * Ensure fairness, consistency of practice, alignment with policies and procedures and adherence to all relevant laws in all situations handled.   * Identifies common questions and issues and create solutions to address them.   * Maintain data in the various HR systems (GEMS, Kronos, Careers etc.) Ensure data integrity and preform accurate and timely entry. * Perform designated audit reports and make the necessary corrections in the system, or work with the appropriate groups to accomplish.   * Ensure the various employee compliance notifications are sent as directed and if they do not reach the designated  reach out the to the HR BP or District coordinator to facilitate.   * Act as a liaison for employees, retirees, ex-employees, managers and HR to obtain answers to questions or resolve issues by partnering with other intercompany departments such as Employee Relations, Tax, Payroll, Legal, and Benefits   * Collect all necessary paperwork to document and administrate retirements and notify HR and Management of any pertinent details including how to update time keeping and GEMS accurately.   2. STANDARDIZATION AND PROCESS IMPROVEMENT   * Analyze tasks assigned and redesign and document each in a Standard Operating Procedure (SOP). Update SOPs as process changes. * Identify other potential activities that can be added to Shared Services.   * Participate in Transactional Lean Training & become LEAN Certified. Use the methods & tools learned through LEAN training to identify and implement continuous improvement initiatives within the department and/or for the S-W HR community  throughout the year.   3. HR TOOLS & SERVICE LEVEL AGREEMENTS:   * Answer phone calls, respond to emails and IMs, create cases and preform responsibilities within the established service level agreements for each duty assigned.   * Utilize case management to document each request and to escalate work to appropriate teams. Leverage the case management self-service portal with HRBPs, Managers and employees to assist with self-service.   * Resolve assigned cases within designated Service Level Agreements   * Leverage nexTune, [employer], and [employer] to obtain information and  HRBPs, employees, and managers on how to find and use these resources.   POSITION REQUIREMENTS: Basic Qualifications:   * Bachelors Degree from an accredited institution is required   * 1-3 years of experience in a fast paced customer service environment   * Must be authorized to work in the [place] without company sponsorship Preferred Qualifications:   * Human Resources experience   * Bachelors Degree in Human Resources or business related field   * Bi-lingual ability (Telugu) Personal Attributes:   * Exceptional interpersonal and customer service skills   * Excellent verbal and written communication skills   * Exceptional organizational skills with attention to detail, accuracy and ability to maintain confidential data   * Ability to analyze situations and interpret facts to make decisions and resolve problems   * Ability to manage multiple simultaneous tasks with minimal supervision   * Demonstrated ability to work in a team  Strong computer skills   * Adaptability to change and desire and ability to make improvements and to continuously improve processes and systems   Equal Opportunity Employer. All qualified candidates will receive consideration for employment and will not be discriminated against based on race, color, Lutheran, sex, sexual orientation, gender identify, national origin, protected  status, disability, age,  pregnancy, genetic information, creed, citizenship status, marital status or any other consideration prohibited by law or contract. [facility] Federal Contractor requesting priority referral of protected veterans.   **Primary Location: **[facility] **Work Locations: ** **Organization: **[employer] **Schedule: **Full-time **Job Posting: **Jul 11, 2018 **Travel: **No  Occupation at time of injury:  Payment  at [employer]  Physician:  Dr. Habibeh Gitiforooz  Primary Diagnosis:  Diagnosis and Precautions: M79.7 (ICD-10-CM) - Fibromyalgia  Referred by: Referred By: Dr. Henao  Date of Injury (If Applicable):  N.A.  Gender: FEMALE  Dominant Hand: RIGHT HANDED  Current Occupation:   at [employer]    Reason for Testing:   Work from home vs. Returning to office full time    Description of test done:  RETURN TO WORK FCE ONE DAY    This cover letter provides a brief summary of relevant data regarding Loida Castellanoss physical abilities that were ascertained from the Functional Capacity Evaluation performed on 12/26/2024.      History:  Patient is a 39 year old RIGHT HANDED dominant FEMALE who comes in with an extensive medical history.  SHE stated that SHE is currently working and SHE is completing the majority of her everyday tasks.  SHE completed all tasks as able, within HER tolerance limits.  These tests were performed in a consistent manner.  These medical conditions and findings were gathered during the patient interview, demonstration and from patient report.  The results were also discussed with the client.      Overall Physical Demand Level:  Light/Medium    Effort and Cooperation:  Client's patterns of movement are consistent with the effort performed.  Client demonstrated cooperative behavior throughout testing.  Patient maintained a consistent level of effort throughout the evaluation.  Patient followed instructions accurately and  completely.  No instances of non-compliance or resistance.  Patient was open and honest about their limitations and symptoms.  Patient appeared motivated to perform to their best ability during testing.  No signs of disengagement or lack of interest during testing.    Consistency of Performance:  Client's performance was consistent throughout the entire FCE.  Patient actively participated during the evaluation.  Patient maintained a consistent pace and accuracy during various tasks.      Pain Report:  Patient does complain of increased neck/lower back pain throughout testing.    Safety:  Patient demonstrates safe overall movements during FCE testing.    Quality of Movement:  Client demonstrates good quality of movement overall during testing including movement efficiency (coordination and balance).    Abilities/Strengths:  SITTING TOLERANCE, STANDING BALANCE, WALKING BALANCE, CLIMBING STAIRS, KNEELING BILATERALLY, KNEELING ON ONE KNEE, STOOPING, FOOT CONTROL, REACHING, SIMPLE GRASP, POWER GRASP, PINCHING, and SENSORY DISCRIMINATION    Limitations:  STANDING TOLERANCE, WALKING TOLERANCE, UPPER EXTREMITY ROM, LOWER EXTREMITY ROM, SPINAL ROM, UPPER EXTREMITY STRENGTH, LOWER EXTREMITY STRENGTH, CORE STRENGTH, CROUCHING BALANCE, MATERIAL HANDLING, CLIMBING LADDERS, CROUCHING, SQUATTING, CRAWLING (HANDS/KNEES), CRAWLING (HANDS/FEET), and CARDIORESPIRATORY    Potential Barriers to Return to Work  Work Schedule and Environment  Current Arrangement: Patient works in-office five days per week but prefers a hybrid schedule (three days at home, two in-office) due to fibromyalgia, joint pain, and limited sitting tolerance.  Challenges:  Prolonged sitting requirements due to significant desk work involving emails, calls, and HR systems (e.g., Kronos, GEMS).  Current office setup does not allow for posture variation or frequent movement breaks.  Limited flexibility in remote work options within the current job  description.  Physical Limitations  Sitting Tolerance: Low due to elevated lower back and neck pain.  Mobility:  Reduced standing and walking tolerance.  Stiffness and bilateral joint pain in shoulders, knees, hips, and IT bands, impacting movement and repetitive tasks.  Underlying Conditions:  Fibromyalgia, hypertension, and Raynaud’s exacerbate fatigue and pain.  High disability index scores (Neck Disability Index: 42%, Oswestry: 46%) indicate significant functional impairments.  Cognitive and Psychological Factors  Chronic Pain and Anxiety: May reduce focus and productivity.  High Cognitive Demands:  Requires strong organizational skills, attention to detail, and multitasking.  Sleep apnea, migraines, and fibro fog may further impair concentration and energy levels.  Physical/Ergonomic Risks  Repetitive hand and wrist movements for  may aggravate bilateral shoulder, arm, and IT band pain.  Long hours (Monday to Friday, 8:30 AM to 7:00 PM) exacerbate fatigue and pain due to limited rest and recovery time.    Recommendations for Addressing Concerns  Workplace Accommodations  Hybrid Work Schedule:  Implement a three-day remote, two-day in-office schedule to reduce commuting strain and support flexible posture changes.  Ergonomic Adjustments:  Provide an adjustable ergonomic chair with lumbar and neck support.  Use a height-adjustable desk to alternate between sitting and standing.  Ensure external peripherals (keyboard, mouse) and screen height minimize strain.  Breaks and Posture Changes:  Schedule short breaks every 30-60 minutes for movement and stretches (e.g., neck rolls, thoracic extensions, seated hip stretches).  Use alarms or reminders to encourage consistent breaks.  Task Modifications:  Break complex tasks into manageable segments.  Delegate physically demanding tasks where feasible.  Supportive Tools:  Use [id number] software and wrist rests to reduce repetitive strain.  Provide heat/cold  packs or lumbar cushions for pain relief.  Medical and Physical Therapy Support  Pain Management:  Utilize manual therapy, TENS, and heat/ice applications for symptom relief.  Targeted Exercises:  Gentle stretching for the cervical, thoracic, and lumbar spine.  Strengthening of postural muscles (core, glutes, upper back).  Low-impact aerobic activities (e.g., recumbent biking, swimming).  Fibromyalgia-Specific Strategies:  Gradual activity progression to avoid overexertion.  Incorporate relaxation techniques like diaphragmatic breathing and mindfulness.  Cognitive Support  Use digital task management tools for reminders and prioritization.  Provide written instructions for complex assignments to counteract fibro fog.  Limit multitasking and allow focused attention on one task at a time.  Employer Communication and Advocacy  HR Collaboration:  Advocate for reasonable accommodations under ADA guidelines.  Provide medical documentation to support the need for a hybrid schedule and ergonomic modifications.  Education and Awareness:  Educate team members and supervisors on fibromyalgia to foster a supportive work environment.  Regular Follow-Up:  Schedule periodic reassessments (every 3-6 months) to evaluate the effectiveness of accommodations and make necessary adjustments.    Short-Term Return to Work Plan  Hybrid Schedule: Begin with three remote days and two in-office days, reassess after 4-6 weeks.  Ergonomic Setup: Ensure proper workstation adjustments both at home and in-office.  Structured Breaks: Schedule micro-breaks and movement sessions throughout the workday.  Progress Tracking: Regularly monitor pain, function, and productivity to refine accommodations.    Long-Term Recommendations  Maintain a flexible hybrid work arrangement if effective.  Continue physical therapy and pain management interventions.  Evaluate functional capacity periodically to align job responsibilities with the patient’s evolving  capabilities.    Summary/Recommendations:  RECOMMEND PHYSICAL THERAPY, RECOMMEND PAIN MANAGEMENT, RECOMMEND SEEING A SPINE SPECIALIST/SURGEON, GET A SCRIPT FOR WORK CONDITIONING, and FOLLOW UP WITH YOUR PHYSICIAN WHO REFERRED YOU FOR THIS FCE  FUNCTIONAL TOLERANCES SUMMARY  SITTING TOLERANCE ABLE  OCCASIONAL   STANDING TOLERANCE ABLE  OCCASIONAL   WALKING TOLERANCEABLE  RARE   STANDING BALANCE ABLE  OCCASIONAL   WALKING BALANCE ABLE  OCCASIONAL   CROUCHING BALANCE patient deferred testing    CLIMBING STAIRS ABLE  OCCASIONAL   CLIMBING LADDERS  patient deferred testing   KNEELING BILATERALLY ABLE  RARE   KNEELING ON ONE KNEE ABLE  RARE   STOOPING ABLE  RARE   SQUATTING RESTRICTED  AVOID   CRAWLING (HANDS/KNEES)  patient deferred testing   CRAWLING (HANDS/FEET) patient deferred testing     FOOT CONTROL   Heel raises  right ABLE  OCCASIONAL   left ABLE  OCCASIONAL  Toe Raises  right ABLE  RARE   left ABLE  RARE  DOMINANT HAND (RIGHT OR LEFT):  right  REACHING OVERHEAD   R ARM ABLE  OCCASIONAL   LEFT ARM ABLE  OCCASIONAL   REACHING BELOW SHOULDER   right ABLE  OCCASIONAL   LEFT ARM ABLE  OCCASIONAL   SIMPLE GRASP   RIGHT ARM ABLE  FREQUENT   LEFT ABLE  FREQUENT   POWER GRASP   RIGHT ARM ABLE  FREQUENT    LEFT ABLE  FREQUENT   TIP PINCH   RIGHT ABLE  FREQUENT    LEFT ABLE  FREQUENT   KEY PINCH   RIGHT ABLE  FREQUENT    LEFT ABLE  FREQUENT   PALMAR PINCH   RIGHT ABLE  FREQUENT   LEFT ABLE  FREQUENT   KEYBOARDING   RIGHT ABLE  FREQUENT    LEFT ABLE  FREQUENT   SENSORY DISCRIMINATION   RIGHT ABLE  FREQUENT    LEFT ABLE  FREQUENT   SEE MATERIAL HANDLING CHART BELOW    CARDIORESPIRATORY (VO2 MAX AND MET LEVEL)      If permanent, the results of the FCE should be considered applicable for a range of time up to 6 months. This is dependent on the nature of the injury/illness, and whether any other health condition, injury or other factor changes the individual's health status or lifestyle. In the absence of any substantive change  in the individual's health status or lifestyle, a repeat FCE to update the individual's functional status is recommended.    It is my professional opinion that patient Loida Blount gave a consistent performance and effort during HER functional capacity evaluation. The results of this evaluation are a valid representation of HER current functional abilities.     OP PT Plan  PT Plan: No Additional PT interventions required at this time  PT Frequency: One time visit  Duration: 1 visit  Certification Period Start Date: 12/26/24  Certification Period End Date: 01/09/25  Number of Treatments Authorized: 1  Rehab Potential: Good  Plan of Care Agreement: Patient    Sincerely,  Lokesh SEAMAN Rozvincent, PT, [phone]     FCE History  Any falls in the past 6 months?  NO    Mechanism/Type of injury:  Patient reports that everything started 5 years ago.  Patient reports having her first mammogram and was having R sided breast pain.  Patient reports having a breast biopsy due to her R breast leaking and in pain.  Patient reports seeing a Rheumatologist 4 years ago Dr. Cottrell and had 2 positive JAMIN tests.  Patient reports she started having aches and pains everywhere.  Patient reports being prescribed muscle relaxants and she would get very tired and she would only take them if she had a 3 day weekend.  Patient reports she has tried Yoga and struggled with this.  Patient reports using Asper creme for the pain.  Patient reports being diagnosed with Fibromyalgia 4 years ago.  Patient reports having no treatment for the Fibromyalgia thus far.    Previous Treatment:  Chiropractic ?  NO   Pain Management ?   NO   Acupuncture ?   NO   Surgery ?    NO   Previous Physical therapy?    NO   Previous Occupational therapy ?  NO   Previous speech therapy ?  NO     MEDICAL HISTORY   Note:  Copy Problem List from Snapshot  HYPERTENSION, HIGH CHOLESTEROL, ANGINA, ANEMIA, ANXIETY, SLEEP APNEA, HEARING/VISION IMPAIRMENTS, HEADACHES, MIGRAINES, and  FIBROMYALGIA, Raynaud's  Medical Precautions:  (See FCE)  STEADI Fall Risk Score (The score of 4 or more indicates an increased risk of falling): 0     Medical Precautions:  (See FCE))     History of Fractures/Dislocations:  none  Any previous orthopedic surgeries:  none  Use of Assistive Devices:  none    OTHER JOINT ISSUES, CERVICAL SPINE, THORACIC SPINE, LUMBAR SPINE, HIPS, KNEES, and SHOULDERS      General Health Checklist  GENERAL HEALTH CHECKLIST SYMPTOMS, ANY RECENT, UNEXPLAINED FATIGUE, CHILLS, and COGNITION CHANGES      SURGERIES: breast biopsy, partial hysterectomy    ALLERGIES: Seasonal    MEDICATIONS:    Current Outpatient Medications   Medication Instructions   • methocarbamol (ROBAXIN) 500 mg, oral, Nightly   Steroid cream (as needed)    Radiology:    None    Functional Status/Activity level:  Level of [place]:  (See FCE)  basic ADL's/instrumental ADL's including  Sleeping:  Having issues with activity, see side note averages 4-5 hours of sleep per night (neck/back wakes her up)  Dressing:   Having issues with activity, see side note socks can be a struggle  Using the restroom/toilet:   No issues, independent with activity   Bathing or Showering:   No issues, independent with activity   Personal hygiene:   No issues, independent with activity   Eating and Drinking:   No issues, independent with activity   Preparing meals/Cooking:   No issues, independent with activity   Washing dishes:   No issues, independent with activity   Managing Medication:   No issues, independent with activity does not take her meds as much as she should  Reaching all planes:   Having issues with activity, see side note back/neck pain   Using phone/Technology/Computer/Ipad:   Having issues with activity, see side note back/neck pain  Reading:   No issues, independent with activity    Writing:   No issues, independent with activity   Typing:   No issues, independent with activity   Managing finances:   No issues,  independent with activity   Driving:  No issues, independent with activity   Shopping/ Getting Groceries:   No issues, independent with activity   Running errands/Appointments:   No issues, independent with activity   Cleaning / Maintaining home:   No issues, independent with activity   Vacuuming:   No issues, independent with activity   Dusting:   No issues, independent with activity   Laundry:  No issues, independent with activity   Environment    Heat:   Having issues with activity, see side note stays inside  Cold:   Having issues with activity, see side note stays inside (fingers hurt)  Dry:   No issues, independent with activity   Wet:   Having issues with activity, see side note increased joint pain  Walking:   Having issues with activity, see side note increased back pain/leg pain if walking > 1 mile  Standing:   Having issues with activity, see side note increased pain after 20 minutes  Sitting:   Having issues with activity, see side note increased pain after 20-30 minutes  Stairs:   No issues, independent with activity   Bending:   Having issues with activity, see side note increased back pain  Stooping:  Having issues with activity, see side note increased back pain  Squatting:   Having issues with activity, see side note increased leg pain  Lifting/pushing/pulling/carrying:   Having issues with activity, see side note increased back/leg pain  Hobbies:  No issues, independent with activity   Work:   Having issues with activity, see side note increased back pain/neck pain  Pet care:   No issues, independent with activity     Impact on School/work performance:  increased back/neck pain at work if sitting prolonged periods  Last time/day worked:  working currently    PRIOR INJURIES: Date or Year Type of Injury (i.e.-auto, work):  none     CHIEF COMPLAINTS (if applicable):   Pain Assessment:  (See FCE)  Neck pain/stiffness  Lower back pain/stiffness  Upper back pain/stiffness  Leg pain  Bilateral shoulder  pain  Bilateral knee pain/hip pain  Bilateral IT band pain  Pain Assessment  Pain Assessment:  (See FCE)    Pain report:  Body Region: 1. Cervical spine 2. Thoracic spine  3. Lumbar spine 4. R hip and L hip 5. R knee and L knee, 6.  Bilateral shoulders, 7.  Bilateral IT band pain  Pain level current:  1. 6/10 2. 4/10 3. 6/10 4. 6/10 5. 4/10, 6.  4/10, 7. 2/10  Pain level least in a 24 hour period: 1. 3/10 2. 3/10 3. 3/10 4. 3/10 5. 3/10, 6. 3/10, 7. 2/10  Pain level worst in a 24 hour period: 1. 6/10 2. 4/10 3. 6/10 4. 6/10 5. 6/10, 6.  4/10, 7. 3/10    Location of pain:  bilateral IT band pain, posterior neck pain, lower back pain, lateral hip pain, bilateral anterior knee pain  Quality of pain:  sharp pain, throbbing, shooting pain  Duration of pain:  constant  Aggravating factors:  prolonged sitting/standing/walking, stairs, bending, lifting, carrying/push/pull  Alleviating factors:  none    Return to work information: see job description  Goals:  Get this test done    Systems Review:  as filled out by patient (positives listed below by each system)  Cardiovascular- SHORTNESS OF BREATH, IS YOUR BLOOD PRESSURE UNDER CONTROL, and UNSURE     Constitutional- FATIGUE and CHILLS   Endocrine:  HEAT INTOLERANCE, COLD INTOLERANCE, DRY SKIN, IS YOUR BLOOD SUGAR UNDER CONTROL ?, and YES  GI:  NONE   Genitourinary:  NONE  Hematology:   NONE   HENT:  NONE  Integumentary:  NONE  Respiratory:  NONE  Musculoskeletal:  DIFFICULTY LYING FLAT DUE TO MUSCLE PAIN and BACK PAIN   Neurologic:  HEADACHES and NUMBNESS/TINGLING    Outcome Measures:  Other Measures  Neck Disability Index: 42%  Oswestry Disablity Index (ULISES): 46%    Pain Disability Index  Family Home Responsibilities  4  Recreation  4  Social Activity  4  Occupation  6  Sexual Behavior  3  Self Care  3  Life Supporting Activities  5  29/70    Objective Testing  Vital Signs  Heart Rate (HR):  74 bpm  Blood Pressure (BP):  122/82 mmHg  Oxygen Saturation (O2 Sat):   99%  Height:  5 feet, 5.5 inches  Weight:  199.8 lbs.    Orientation:  SHE was alert, oriented x 3    Splints/Braces/Assistive Devices worn by patient during FCE:  None  Gait:  Normal gait  Coordination:  bilateral upper extremity coordination WNL's, decreased lower extremity coordination bilaterally  Sensation:  patient denies numbness/tingling of bilateral upper/lower extremities  Palpation:  point tenderness over bilateral upper trapezius, levator scapula, cervical/lumbar paraspinals  Strength/ROM:  see Excel spread sheet  Upper Extremity:  see Excel spread sheet  Right:  see Excel spread sheet  Left:  see Excel spread sheet  Lower Extremity:  see Excel spread sheet  Right:  see Excel spread sheet  Left:  see Excel spread sheet  Lumbar AROM:  see Excel spread sheet  Cervical AROM:  see Excel spread sheet  Thoracic AROM:  see Excel spread sheet    FCE TEST SHEET:   Integumetary System: NOT IMPAIRED   Neuromuscular System: IMPAIRED Core stability/core strength  Musculoskeletal System: IMPAIRED Gross Range of Motion, Gross Strength    Communication/Cognition: NOT IMPAIRED      FCE TEST ACTIVITIES SIT, STAND, WALK TOLERANCES   SITTING (Norm: 30 min) Max Time: 30 Minutes  JOINT PAIN and GRIMACING  STANDING (Norm: 30 min) Max Time: 20 Minutes  JOINT PAIN, LEAN ON SUPPORT/DEVICE, and GRIMACING   WALKING (Norm: 30 min @ 2.0 mph) Max Time: 7  Minutes JOINT PAIN, LOWER EXTREMITY WEAKNESS, SHORTNESS OF BREATH, and HOLD RAILING    Any falls in the past 6 months?  NO   BALANCE  TESTING  Standing Balance (Norm: 30 sec) ABLE  OCCASIONAL, HR 96 bpm   Walking Balance (Norm: 6 feet) ABLE  OCCASIONAL,  bpm   Crouching Balance (Norm: 30 sec) patient deferred test     MORAN Test 48 / 56 LOW Fall Risk  Moran Balance Scale  1. Sitting to Standing: Able to stand without using hands and stabilize independently  2. Standing Unsupported: Able to stand safely for 2 minutes  3. Sitting with Back Unsupported but Feet Supported on Floor or  on a Stool: Able to sit safely and securely for 2 minutes  4. Standing to Sitting: Sits safely with minimal use of hands  5.  Transfers: Able to transfer safely with minor use of hands  6. Standing Unsupported with Eyes Closed: Able to stand 10 seconds safely  7. Standing Unsupported with Feet Together: Able to place feet together independently and stand 1 minute safely  8. Reach Forward with Outstretched Arm While Standing: Can reach forward 12 cm (5 inches)  9.  Object from Floor from a Standing Position: Able to  slipper safely and easily  10. Turning to Look Behind Over Left and Right Shoulders While Standing: Looks behind from both sides and weight shifts well  11. Turn 360 Degrees: Able to turn 360 degrees safely in 4 seconds or less  12. Place Alternate Foot on Step or Stool While Standing Unsupported: Able to stand independently and safely and complete 8 steps in 20 seconds  13. Standing Unsupported One Foot in Front: Loses balance while stepping or standing  14. Standing on One Leg: Tries to lift leg unable to hold 3 seconds but remains standing independently  Saunders Balance Score: 48  Other Measures  Neck Disability Index: 42%  Oswestry Disablity Index (ULISES): 46%     MATERIAL HANDLING (Occasional)  Empty box weighs 7.5 lbs.  RPE Scale 1-10 Description  No exertion at all  Very light  Light  Between Light and somewhat hard  Somewhat hard  Between somewhat hard and Hard (Heavy)  Hard (heavy)  Between Hard and very hard  Between very hard and maximal exertion  Maximal Exertion    Combined Lift/Carry Test    Endpoint: PSYCHOPHYSICAL   Weight lifted: 27.5 lbs.  RPE:  7  HR:  79 bpm  Pain:  6/10 lower back, 8/10 neck    PUSH (25 feet - Norm: 100 lbs)    Endpoint: PSYCHOPHYSICAL    Weight lifted:  50 lbs.  RPE:  7  HR:  91 bpm  Pain:  6/10 lower back    PULL (25 feet - Norm: 80 lbs)   Endpoint: PSYCHOPHYSICAL    Weight lifted:  50 lbs.  RPE:  7  HR:  91 bpm  Pain:  6/10 lower back    Floor to Waist  Lift   Endpoint: PSYCHOPHYSICAL    Weight lifted:  32.5 lbs.  RPE:  5  HR:  84 bpm  Pain:  8/10 Neck    Waist to Shoulder   Endpoint: PSYCHOPHYSICAL    Weight lifted:  22.5 lbs.  RPE:  6  HR:  83 bpm  Pain:  7/10 neck, lower back    Shoulder to Overhead   Endpoint: PSYCHOPHYSICAL    Weight lifted:  7.5 lbs.  RPE:  6  HR:  75 bpm  Pain:  7/10 Neck    Bilateral Carry (14 Feet)   Endpoint: PSYCHOPHYSICAL    Weight lifted:  17.5 lbs.  RPE:  6  HR:  85 bpm  Pain:  7/10 Neck, lower back    FUNCTIONAL ACTIVITIES A   Climbing Stairs (Norm: up/down 1 flight) ABLE  OCCASIONAL,  bpm   Limits: FATIGUE and REQUIRES RAILING/DEVICE  Climbing Ladders (up/down 10 ft ladder)   patient deferred testing       Kneel: any knee (Norm: 30 seconds) ABLE  RARE, HR 74 bpm  Kneel: Both knees (Norm: 30 seconds) ABLE  RARE, HR 74 bpm    Stooping: Static forward bend test:  ABLE  RARE, HR 98 bpm   Repetitive Squat Test:  ARU: RESTRICTED   AVOID  # Reps: 7 Limits: WEAKNESS and JOINT PAIN,  bpm    FUNCTIONAL ACTIVITIES B   Crawl: hands/Knees (Norm: 6 feet)   patient deferred testing    Crawl: hand/feet (Norm: 6 feet)      patient deferred testing      Foot Control - Rt (5 toe/5 calf raises) heel raises ABLE  OCCASIONAL, HR 90 bpm, toe raises ABLE  RARE, HR 73 bpm    Overhead Reaching Test:  ABLE  OCCASIONAL, HR 91 bpm     HAND DEXTERITY - HANDLING   Simple Grasp - Seize, hold, grasp, or turn small objects with the RIGHT hand? ABLE  FREQUENT, HR 72 bpm   Simple Grasp - Seize, hold, grasp, or turn small objects with the LEFT hand? ABLE  FREQUENT      Power Grasp -    Strength Rt TRIAL 1:  40 lbs. TRIAL 2:  40 lbs. TRIAL 3: 50 lbs.   Power Grasp -  Strength Lt TRIAL 1: 38 lbs. TRIAL 2: 54 lbs. TRIAL 3: 56 lbs., HR 72 bpm         HAND DEXTERITY - FINGERING   Simple Picking:  a nut with all fingers   RT hand? ABLE  FREQUENT, HR 76 bpm    Simple Picking:  a nut with all fingers   LT hand? ABLE  FREQUENT      In lbs.  TIP  PINCH Trial 1 - RT:  9.4/10.6 LT: Trial 2 - RT: 6.0/6.8 LT:  Trial 3 - RT:  7.6/10.5 LT:   KEY PINCH Trial 1 - RT: 17.4/17.1 LT: Trial 2 - RT: 17.9/21.3 LT: Trial 3 - RT: 19.2/13.8 LT:   PALMAR PINCH Trial 1 - RT: 10.3/12.5 LT: Trial 2 - RT: 7.5/11.0 LT:  Trial 3 - RT: 8.5/11.7 LT:, HR 76 bpm      SENSORY   Discrimination between round and rectangular (washer vs. square nut):   RT: ABLE  FREQUENT    LT: ABLE  FREQUENT, HR 68 bpm    Discrimination between large and small objects (quarter vs. dime):   RT: ABLE  FREQUENT    LT: ABLE  FREQUENT      Discrimination between rough and smooth textures (sandpaper vs. cloth):   RT: ABLE  FREQUENT    LT: ABLE  FREQUENT     CARDIO-RESPIRATORY (if applicable): VARIABLES (If applicable)   Name or Type of Test: 1 mile treadmill walking test  Resting HR:  78 bpm  Speed of Treadmill: 2.0 mph  Max HR: 120 bpm  Distance: Other:   0.24 mile  Time:  7 minutes, 21 seconds  % grade:  0%  Calories:  21.1 calories  Blood pressure before testin/89 mmHg  Blood pressure after test:  134/90 mmHg  HR recovery:  81 bpm  Oxygen Saturation before testin%  Oxygen Saturation after testin%  39 year old female  Weight:  199.8 lbs.  Stopped testing due to complaints of lower back pain  Please estimate the VO2 max and MET level and show calculations.                CONSISTENCY OF EFFORT -   OVERALL OBSERVATION In your professional opinion, did the patient give a consistent performance and effort during this functional capacity evaluation? YES   BELL SHAPE CURVE   In lbs.  Right hand  Level 1:  32  Level 2:  46  Level 3:  52  Level 4:  39  Level 5:  34    Left hand  Level 1:  42  Level 2:  54  Level 3:  42  Level 4 :  41  Level 5:  30  HR  70 bpm    JAI NON ORGANIC SIGNS TEST   Superficial tenderness:  POSITIVE  Non-anatomic SIMULATION:  NEGATIVE  Axial Loading:  NEGATIVE  Acetabular rotation:  POSITIVE  Distraction:  NEGATIVE  Regional sensory disturbance:  NEGATIVE  Regional  weakness:  NEGATIVE  Overreaction: NEGATIVE    BP at end of session:  134/90 mmHg  HR at end of session:  81 bpm  O2 Saturation at end of session:  98%                                Current Participants as of 12/26/2024    Name Type Comments Contact Info    Sonny Yuan MD Referring Provider  [phone]    Signature pending    Lokesh Ortiz PT Physical Therapist  [phone]    Signature pending

## 2025-01-17 ENCOUNTER — APPOINTMENT (OUTPATIENT)
Dept: RHEUMATOLOGY | Facility: CLINIC | Age: 40
End: 2025-01-17
Payer: COMMERCIAL

## 2025-01-20 ENCOUNTER — APPOINTMENT (OUTPATIENT)
Dept: PHYSICAL THERAPY | Facility: CLINIC | Age: 40
End: 2025-01-20
Payer: COMMERCIAL

## 2025-02-24 ENCOUNTER — APPOINTMENT (OUTPATIENT)
Dept: RHEUMATOLOGY | Facility: CLINIC | Age: 40
End: 2025-02-24
Payer: COMMERCIAL

## 2025-05-16 ENCOUNTER — APPOINTMENT (OUTPATIENT)
Dept: RHEUMATOLOGY | Facility: CLINIC | Age: 40
End: 2025-05-16
Payer: COMMERCIAL

## 2025-05-16 VITALS
SYSTOLIC BLOOD PRESSURE: 138 MMHG | OXYGEN SATURATION: 95 % | BODY MASS INDEX: 31.8 KG/M2 | HEART RATE: 88 BPM | WEIGHT: 197 LBS | DIASTOLIC BLOOD PRESSURE: 77 MMHG | TEMPERATURE: 98.2 F

## 2025-05-16 DIAGNOSIS — M79.7 FIBROMYALGIA: Primary | ICD-10-CM

## 2025-05-16 PROCEDURE — 99214 OFFICE O/P EST MOD 30 MIN: CPT | Performed by: INTERNAL MEDICINE

## 2025-05-16 RX ORDER — DULOXETIN HYDROCHLORIDE 20 MG/1
20 CAPSULE, DELAYED RELEASE ORAL DAILY
Qty: 30 CAPSULE | Refills: 8 | Status: SHIPPED | OUTPATIENT
Start: 2025-05-16 | End: 2026-05-16

## 2025-05-16 ASSESSMENT — PAIN SCALES - GENERAL: PAINLEVEL_OUTOF10: 0-NO PAIN

## 2025-05-18 NOTE — PROGRESS NOTES
She was last seen in the rheumatology department 12/14/2023.  She is a 39-year-old female with history of sickle cell trait, iron deficiency anemia, Raynaud's phenomenon, positive JAMIN with negative JAMIN panel.  She presents with complaints of pain in her mid back and shoulders and right hip.  She has been having discomfort involving the mid back and right thigh without any associated numbness or weakness.  There is no change in bowel or bladder habits.  She continues to note some discoloration of her hands with cold exposure.  She has not noted any rashes or joint effusions.  She has transient morning stiffness.  The lower back pain is exacerbated by movement and improved with rest.  She continues to note hair loss about the scalp hairline.  PH: Allergies: No known drug allergies; illnesses: Sickle cell trait, iron deficiency anemia secondary to blood loss, sinusitis; surgeries: Essure device (2/2013) that was subsequently removed due to dysfunctional uterine bleeding, uterine ablation, dental extraction.  SH: He denies any tobacco, alcohol, or illicit drug use. 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  FH: Father at age 52 had myocardial infarction. Mother had thyroid cancer and eczema. She has 6/2 brothers with unknown health history. She has no sisters. Her son is healthy. She has a daughter with sickle cell trait and another daughter with eczema. Paternal grandfather had prostate cancer. Maternal grandfather had heart disease. Maternal grandmother had diabetes mellitus.  PX: She is a well-developed, well-nourished, black female. The lungs, heart, abdomen, and extremities are benign. The musculoskeletal examination does not show any joint effusions. There is good passive range of motion of the upper and lower extremity joints.  There is mild tenderness in the right lower back and the lateral aspect of the right hip.  There is lower back pain with lumbar extension and with rotation of the  lumbar spine.  The straight leg raise test is normal bilaterally in the seated position.  Laboratory (1/6/2024) urine protein/creatinine ratio 0.11, C4 55, C3 167, glucose 65, sodium 142, potassium 5.1, BUN 15, creatinine 0.90, calcium 10.5, albumin 4.6, alkaline phosphatase 84, AST 22, ALT 24, WBC 4.8, hemoglobin 12.6, hematocrit 40.2, MCV 85, MCHC 31.3, platelets 338, CRP 0.66, CPK 96, JAMIN negative, DEANNE panel negative.  Impression: 39-year-old black female with history of sickle cell trait, iron deficiency anemia, Raynaud's phenomena, positive JAMIN with negative DEANNE panel, mid to lower back and right pain question fibromyalgia, lumbar radiculopathy, undifferentiated connective tissue disease.  Plan: She is to start duloxetine 20 mg once per day and have laboratory testing for JAMIN panel,CRP, C3, C4, and CBC.  She is to do stretching exercises to the lower back.  She is to return at the next available office appointment.

## 2025-07-03 ENCOUNTER — APPOINTMENT (OUTPATIENT)
Dept: RHEUMATOLOGY | Facility: CLINIC | Age: 40
End: 2025-07-03
Payer: COMMERCIAL

## 2025-07-25 ENCOUNTER — DOCUMENTATION (OUTPATIENT)
Dept: PHYSICAL THERAPY | Facility: CLINIC | Age: 40
End: 2025-07-25
Payer: COMMERCIAL

## 2025-07-25 DIAGNOSIS — M79.7 FIBROMYALGIA: Primary | ICD-10-CM

## 2025-07-25 NOTE — PROGRESS NOTES
Physical Therapy    Discharge Summary    Name: Loida Blount  MRN: [id number]  : 1985  Date: 25    Discharge Summary: PT    Discharge Information: Date of discharge 25    Rehab Discharge Reason: Other See FCE

## 2025-09-05 ENCOUNTER — APPOINTMENT (OUTPATIENT)
Dept: RHEUMATOLOGY | Facility: CLINIC | Age: 40
End: 2025-09-05
Payer: COMMERCIAL

## 2025-12-18 ENCOUNTER — APPOINTMENT (OUTPATIENT)
Dept: RHEUMATOLOGY | Facility: CLINIC | Age: 40
End: 2025-12-18

## 2026-02-05 ENCOUNTER — APPOINTMENT (OUTPATIENT)
Dept: RHEUMATOLOGY | Facility: CLINIC | Age: 41
End: 2026-02-05

## 2026-04-07 ENCOUNTER — APPOINTMENT (OUTPATIENT)
Dept: RHEUMATOLOGY | Facility: CLINIC | Age: 41
End: 2026-04-07
Payer: COMMERCIAL